# Patient Record
Sex: MALE | Race: OTHER
[De-identification: names, ages, dates, MRNs, and addresses within clinical notes are randomized per-mention and may not be internally consistent; named-entity substitution may affect disease eponyms.]

---

## 2017-02-25 ENCOUNTER — HOSPITAL ENCOUNTER (EMERGENCY)
Dept: HOSPITAL 54 - ER | Age: 47
Discharge: HOME | End: 2017-02-25
Payer: MEDICAID

## 2017-02-25 VITALS — WEIGHT: 250 LBS | BODY MASS INDEX: 35.79 KG/M2 | HEIGHT: 70 IN

## 2017-02-25 VITALS — DIASTOLIC BLOOD PRESSURE: 84 MMHG | SYSTOLIC BLOOD PRESSURE: 138 MMHG

## 2017-02-25 DIAGNOSIS — S93.402A: Primary | ICD-10-CM

## 2017-02-25 DIAGNOSIS — I10: ICD-10-CM

## 2017-02-25 DIAGNOSIS — F17.200: ICD-10-CM
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
## 2017-02-25 PROCEDURE — Z7610: HCPCS

## 2017-02-25 PROCEDURE — 73610 X-RAY EXAM OF ANKLE: CPT

## 2017-02-25 PROCEDURE — 99284 EMERGENCY DEPT VISIT MOD MDM: CPT

## 2017-02-25 PROCEDURE — 29515 APPLICATION SHORT LEG SPLINT: CPT

## 2017-02-25 PROCEDURE — A4606 OXYGEN PROBE USED W OXIMETER: HCPCS

## 2017-02-25 PROCEDURE — 73630 X-RAY EXAM OF FOOT: CPT

## 2019-06-03 ENCOUNTER — HOSPITAL ENCOUNTER (OUTPATIENT)
Dept: HOSPITAL 10 - E/R | Age: 49
Setting detail: OBSERVATION
Discharge: HOME | End: 2019-06-03
Attending: INTERNAL MEDICINE | Admitting: INTERNAL MEDICINE
Payer: COMMERCIAL

## 2019-06-03 ENCOUNTER — HOSPITAL ENCOUNTER (OUTPATIENT)
Dept: HOSPITAL 91 - E/R | Age: 49
Setting detail: OBSERVATION
Discharge: HOME | End: 2019-06-03
Payer: COMMERCIAL

## 2019-06-03 VITALS — DIASTOLIC BLOOD PRESSURE: 76 MMHG | RESPIRATION RATE: 19 BRPM | HEART RATE: 78 BPM | SYSTOLIC BLOOD PRESSURE: 130 MMHG

## 2019-06-03 VITALS — HEART RATE: 69 BPM

## 2019-06-03 VITALS — WEIGHT: 245.37 LBS | HEIGHT: 60 IN | BODY MASS INDEX: 48.17 KG/M2

## 2019-06-03 DIAGNOSIS — R07.89: Primary | ICD-10-CM

## 2019-06-03 DIAGNOSIS — E11.9: ICD-10-CM

## 2019-06-03 DIAGNOSIS — F17.200: ICD-10-CM

## 2019-06-03 DIAGNOSIS — E78.5: ICD-10-CM

## 2019-06-03 DIAGNOSIS — I10: ICD-10-CM

## 2019-06-03 DIAGNOSIS — Z79.84: ICD-10-CM

## 2019-06-03 LAB
ADD MAN DIFF?: NO
ANION GAP: 11 (ref 5–13)
BASOPHIL #: 0.1 10^3/UL (ref 0–0.1)
BASOPHILS %: 1.4 % (ref 0–2)
BLOOD UREA NITROGEN: 9 MG/DL (ref 7–20)
CALCIUM: 9.6 MG/DL (ref 8.4–10.2)
CARBON DIOXIDE: 25 MMOL/L (ref 21–31)
CHLORIDE: 105 MMOL/L (ref 97–110)
CK INDEX: 0.6
CK-MB: 0.32 NG/ML (ref 0–2.4)
CREATINE KINASE: 51 IU/L (ref 23–200)
CREATININE: 0.6 MG/DL (ref 0.61–1.24)
D-DIMER: 353.84 NG/ML (ref ?–460)
EOSINOPHILS #: 0.1 10^3/UL (ref 0–0.5)
EOSINOPHILS %: 2.1 % (ref 0–7)
GLUCOSE: 172 MG/DL (ref 70–220)
HEMATOCRIT: 44.2 % (ref 42–52)
HEMOGLOBIN: 14.9 G/DL (ref 14–18)
IMMATURE GRANS #M: 0.03 10^3/UL (ref 0–0.03)
IMMATURE GRANS % (M): 0.5 % (ref 0–0.43)
LYMPHOCYTES #: 2 10^3/UL (ref 0.8–2.9)
LYMPHOCYTES %: 35.2 % (ref 15–51)
MEAN CORPUSCULAR HEMOGLOBIN: 28.2 PG (ref 29–33)
MEAN CORPUSCULAR HGB CONC: 33.7 G/DL (ref 32–37)
MEAN CORPUSCULAR VOLUME: 83.6 FL (ref 82–101)
MEAN PLATELET VOLUME: 10.2 FL (ref 7.4–10.4)
MONOCYTE #: 0.4 10^3/UL (ref 0.3–0.9)
MONOCYTES %: 6.2 % (ref 0–11)
NEUTROPHIL #: 3.2 10^3/UL (ref 1.6–7.5)
NEUTROPHILS %: 54.6 % (ref 39–77)
NUCLEATED RED BLOOD CELLS #: 0 10^3/UL (ref 0–0)
NUCLEATED RED BLOOD CELLS%: 0 /100WBC (ref 0–0)
PLATELET COUNT: 157 10^3/UL (ref 140–415)
POTASSIUM: 4 MMOL/L (ref 3.5–5.1)
RED BLOOD COUNT: 5.29 10^6/UL (ref 4.7–6.1)
RED CELL DISTRIBUTION WIDTH: 13.2 % (ref 11.5–14.5)
SODIUM: 141 MMOL/L (ref 135–144)
TROPONIN-I: < 0.012 NG/ML (ref 0–0.12)
TROPONIN-I: < 0.012 NG/ML (ref 0–0.12)
WHITE BLOOD COUNT: 5.8 10^3/UL (ref 4.8–10.8)

## 2019-06-03 PROCEDURE — 82550 ASSAY OF CK (CPK): CPT

## 2019-06-03 PROCEDURE — 82553 CREATINE MB FRACTION: CPT

## 2019-06-03 PROCEDURE — 75574 CT ANGIO HRT W/3D IMAGE: CPT

## 2019-06-03 PROCEDURE — 93005 ELECTROCARDIOGRAM TRACING: CPT

## 2019-06-03 PROCEDURE — 99285 EMERGENCY DEPT VISIT HI MDM: CPT

## 2019-06-03 PROCEDURE — 85025 COMPLETE CBC W/AUTO DIFF WBC: CPT

## 2019-06-03 PROCEDURE — 85378 FIBRIN DEGRADE SEMIQUANT: CPT

## 2019-06-03 PROCEDURE — 80048 BASIC METABOLIC PNL TOTAL CA: CPT

## 2019-06-03 PROCEDURE — 71045 X-RAY EXAM CHEST 1 VIEW: CPT

## 2019-06-03 PROCEDURE — 36415 COLL VENOUS BLD VENIPUNCTURE: CPT

## 2019-06-03 PROCEDURE — 93306 TTE W/DOPPLER COMPLETE: CPT

## 2019-06-03 PROCEDURE — 84484 ASSAY OF TROPONIN QUANT: CPT

## 2019-06-03 RX ADMIN — METOPROLOL TARTRATE 1 MG: 100 TABLET ORAL at 16:14

## 2019-06-03 RX ADMIN — ATORVASTATIN CALCIUM 1 MG: 10 TABLET, FILM COATED ORAL at 21:00

## 2019-06-03 RX ADMIN — NITROGLYCERIN 1 INCH: 20 OINTMENT TOPICAL at 13:12

## 2019-06-03 NOTE — CONS
Assessment/Plan


Assessment/Plan


Hospital Course (Demo Recall)


Chest pain


Shortness of breath


Diabetes


Hypertension


Dyslipidemia


Tobacco use





Patient with chest pain at rest which is been off and on over the past few days.


 Initial cardiac enzymes are negative, ECG with no significant ischemic 


abnormalities.


Given his multiple risk factors, patient with further ischemic work-up.  Will 


obtain serial cardiac enzymes, echocardiogram


Initial plans for CT coronary angiogram, in discussion with radiology, there is 


seems to be issues with the imaging software.  If not able to be performed, will


proceed with nuclear cardiac perfusion study once serial troponins are negative





Consultation Date/Type/Reason


Admit Date/Time





Type of Consult


Cardiology


Reason for Consultation


Chest pain


Date/Time of Note


DATE: 6/3/19 


TIME: 15:18





Hx of Present Illness


This is a 48-year-old male with past medical history of hypertension, diabetes, 


dyslipidemia who presents with chest pain.  Symptoms have been off and on since 


yesterday.  Symptoms came about while patient was driving.  He denies exertional


chest pain but does get shortness of breath with exertion which has been going 


on for years.  He also complains of a chronic numbness in his fourth and fifth 


finger which is going on for 6 months.  This is not worse with exertion or 


improved.


12 point review of systems was performed with all pertinent positives and 


negatives mentioned above and all else is negative





Past Medical History


Medical History:  diabetes, high cholesterol, hypertension


Home Meds


Reported Medications


Amlodipine Besylate* (Norvasc*) 10 Mg Tablet, 10 MG PO DAILY, TAB


   6/3/19


Metformin Hcl* (Metformin Hcl*) 500 Mg Tablet, 500 MG PO WITH BREAKFAST, #30 TAB


   6/3/19


Atorvastatin Calcium (Atorvastatin Calcium) 10 Mg Tablet, 10 MG PO QHS, #30 TAB


   6/3/19


Medications





Current Medications


Nitroglycerin (Nitroglycerin (Sl Tab) 0.4 Mg) 1 tab Q5M UP TO 3 DOSES PRN SL 


.CHEST PAIN;  Start 6/3/19 at 13:00


Ondansetron HCl (Zofran Inj) 4 mg ER BRIDGE PRN IV NAUSEA/VOMITING;  Start 


6/3/19 at 14:00;  Stop 6/4/19 at 13:59


Acetaminophen (Tylenol Tab) 650 mg ER BRIDGE PRN PO .MILD PAIN 1-3 OR TEMP;  


Start 6/3/19 at 14:00;  Stop 6/4/19 at 13:59


Allergies:  


Coded Allergies:  


     aspirin (Verified  Allergy, Unknown, 6/3/19)





Social History


Alcohol Use:  occasionally


Smoking Status:  Current every day smoker


Other Social History








Exam/Review of Systems


Vital Signs


Vitals





Vital Signs


  Date      Temp  Pulse  Resp  B/P (MAP)   Pulse Ox  O2          O2 Flow    FiO2


Time                                                 Delivery    Rate


    6/3/19           66    20      132/71        99  Room Air


     14:08                           (91)


    6/3/19  97.7


     11:57








Exam


Constitutional:  alert, oriented (No apparent distress)


Head:  normocephalic


Respiratory:  clear to auscultation, normal air movement


Cardiovascular:  regular rate and rhythm (S1-S2 heard)


Gastrointestinal:  soft, non-tender, bowel sounds


Extremities:  other (No significant edema)





Labs


Result Diagram:  


6/3/19 1252                                                                     


          6/3/19 1252





Results 24hrs





Laboratory Tests


               Test
                                6/3/19
12:52


               White Blood Count                           5.8


               Red Blood Count                            5.29


               Hemoglobin                                 14.9


               Hematocrit                                 44.2


               Mean Corpuscular Volume                    83.6


               Mean Corpuscular Hemoglobin               28.2  L


               Mean Corpuscular Hemoglobin
Concent       33.7  



               Red Cell Distribution Width                13.2


               Platelet Count                              157


               Mean Platelet Volume                       10.2


               Immature Granulocytes %                  0.500  H


               Neutrophils %                              54.6


               Lymphocytes %                              35.2


               Monocytes %                                 6.2


               Eosinophils %                               2.1


               Basophils %                                 1.4


               Nucleated Red Blood Cells %                 0.0


               Immature Granulocytes #                   0.030


               Neutrophils #                               3.2


               Lymphocytes #                               2.0


               Monocytes #                                 0.4


               Eosinophils #                               0.1


               Basophils #                                 0.1


               Nucleated Red Blood Cells #                 0.0


               D-Dimer                                  353.84


               D-Dimer Comment


               Sodium Level                                141


               Potassium Level                             4.0


               Chloride Level                              105


               Carbon Dioxide Level                         25


               Anion Gap                                    11


               Blood Urea Nitrogen                           9


               Creatinine                                0.60  L


               Est Glomerular Filtrat Rate
mL/min   > 60  



               Glucose Level                               172


               Calcium Level                               9.6


               Troponin I                           < 0.012








Imaging


Imaging


ECG with sinus rhythm at 88 bpm, incomplete right bundle branch block with QRS 


106 ms, no significant ischemic ST abnormalities





Medications


Medications





Current Medications


Nitroglycerin (Nitroglycerin (Sl Tab) 0.4 Mg) 1 tab Q5M UP TO 3 DOSES PRN SL 


.CHEST PAIN;  Start 6/3/19 at 13:00


Ondansetron HCl (Zofran Inj) 4 mg ER BRIDGE PRN IV NAUSEA/VOMITING;  Start 


6/3/19 at 14:00;  Stop 6/4/19 at 13:59


Acetaminophen (Tylenol Tab) 650 mg ER BRIDGE PRN PO .MILD PAIN 1-3 OR TEMP;  


Start 6/3/19 at 14:00;  Stop 6/4/19 at 13:59











Ravi Marie DO            Kamran 3, 2019 15:24

## 2019-06-03 NOTE — PDOCDIS
Discharge Instructions


CONDITION


                 [id number]
Patient Condition:  [id number]
Good








HOME CARE INSTRUCTIONS:


          [id number]
Diet Instructions:            [id number]

          [id number]
Activity Restrictions:  [id number]
No Restrictions








FOLLOW UP/APPOINTMENTS


Follow-up Plan


pcp 1 week











SHANICE JONES MD                   Kamran 3, 2019 21:25

## 2019-06-03 NOTE — ERD
ER Documentation


Chief Complaint


Chief Complaint





CHEST PAIN FOR THE PAST WEEK INTERMITTENT. TODAY GOT WORSE. MILD SOB





HPI


Patient is a 48-year-old male with hypertension and prediabetes who presents 


with chest pain.  He said that he started feeling weak about 1 hour ago.  He had


shortness of breath and chest pressure.  He feels dizzy.  He had nausea but no 


vomiting.  The chest pressure was left-sided.  It comes and goes.  He felt this 


on Thursday as well and almost passed out.  The patient said that he would 


prefer to be seen by Dr. Perez's cardiology group.  Upon review of old 


medical records this is the patient's first visit to the emergency department.





ROS


All systems reviewed and are negative except as per history of present illness.





Medications


Home Meds


Reported Medications


Amlodipine Besylate* (Norvasc*) 10 Mg Tablet, 10 MG PO DAILY, TAB


   6/3/19


Metformin Hcl* (Metformin Hcl*) 500 Mg Tablet, 500 MG PO WITH BREAKFAST, #30 TAB


   6/3/19


Atorvastatin Calcium (Atorvastatin Calcium) 10 Mg Tablet, 10 MG PO QHS, #30 TAB


   6/3/19





Allergies


Allergies:  


Coded Allergies:  


     aspirin (Verified  Allergy, Unknown, 6/3/19)





PMhx/Soc


Medical and Surgical Hx:  pt denies Medical Hx, pt denies Surgical Hx


Hx Alcohol Use:  Yes


Hx Substance Use:  No


Hx Tobacco Use:  Yes


Smoking Status:  Never smoker





FmHx


Family History:  coronary disease





Physical Exam


Vitals





Vital Signs


  Date      Temp  Pulse  Resp  B/P (MAP)   Pulse Ox  O2          O2 Flow    FiO2


Time                                                 Delivery    Rate


    6/3/19           66    20      132/71        99  Room Air


     14:08                           (91)


    6/3/19  97.7     91    18      163/95        99


     11:57                          (117)





Physical Exam


Const:   No acute distress


Head:   Atraumatic 


Eyes:    Normal Conjunctiva


ENT:    Normal External Ears, Nose and Mouth.


Neck:               Full range of motion. No meningismus.


Resp:   Clear to auscultation bilaterally


Cardio:   Regular rate and rhythm, no murmurs


Abd:    Soft, non tender, non distended. Normal bowel sounds


Skin:   No petechiae or rashes


Back:   No midline or flank tenderness


Ext:    No cyanosis, or edema


Neur:   Awake and alert


Psych:    Normal Mood and Affect


Result Diagram:  


6/3/19 1252                                                                     


          6/3/19 1252





Results 24 hrs





Laboratory Tests


              Test
                                  6/3/19
12:52


              White Blood Count                      5.8 10^3/ul


              Red Blood Count                       5.29 10^6/ul


              Hemoglobin                               14.9 g/dl


              Hematocrit                                  44.2 %


              Mean Corpuscular Volume                    83.6 fl


              Mean Corpuscular Hemoglobin                28.2 pg


              Mean Corpuscular Hemoglobin
Concent     33.7 g/dl 



              Red Cell Distribution Width                 13.2 %


              Platelet Count                         157 10^3/UL


              Mean Platelet Volume                       10.2 fl


              Immature Granulocytes %                    0.500 %


              Neutrophils %                               54.6 %


              Lymphocytes %                               35.2 %


              Monocytes %                                  6.2 %


              Eosinophils %                                2.1 %


              Basophils %                                  1.4 %


              Nucleated Red Blood Cells %            0.0 /100WBC


              Immature Granulocytes #              0.030 10^3/ul


              Neutrophils #                          3.2 10^3/ul


              Lymphocytes #                          2.0 10^3/ul


              Monocytes #                            0.4 10^3/ul


              Eosinophils #                          0.1 10^3/ul


              Basophils #                            0.1 10^3/ul


              Nucleated Red Blood Cells #            0.0 10^3/ul


              D-Dimer                               353.84 ng/ml


              D-Dimer Comment


              Sodium Level                            141 mmol/L


              Potassium Level                         4.0 mmol/L


              Chloride Level                          105 mmol/L


              Carbon Dioxide Level                     25 mmol/L


              Anion Gap                                       11


              Blood Urea Nitrogen                        9 mg/dl


              Creatinine                              0.60 mg/dl


              Est Glomerular Filtrat Rate
mL/min   > 60 mL/min 



              Glucose Level                            172 mg/dl


              Calcium Level                            9.6 mg/dl


              Troponin I                           < 0.012 ng/ml





Current Medications


 Medications
   Dose
          Sig/Damien
       Start Time
   Status  Last


 (Trade)       Ordered        Route
 PRN     Stop Time              Admin
Dose


                              Reason                                Admin


                1 inch         ONCE  STAT
    6/3/19        DC            6/3/19


Nitroglycerin                 TD
            12:45
 6/3/19                13:12




                                            12:47


(Nitroglyceri


n
 2% Oint)


                1 tab          Q5M UP TO 3    6/3/19                 



Nitroglycerin                 DOSES PRN
     13:00




                             SL
 .CHEST


(Nitroglyceri                 PAIN


n
  (Sl Tab)


0.4 Mg)


 Ondansetron    4 mg           ER BRIDGE      6/3/19                 



HCl
  (Zofran                 PRN
 IV
       14:00
 6/4/19


Inj)                          NAUSEA/VOMITI  13:59


                              NG


                650 mg         ER BRIDGE      6/3/19                 



Acetaminophen                 PRN
 PO
       14:00
 6/4/19



  (Tylenol                   .MILD PAIN     13:59


Tab)                          1-3 OR TEMP


 Metoprolol
    100 mg         ONCE  STAT
    6/3/19        DC       



Tartrate
                     PO
            14:01
 6/3/19


(Lopressor)                                  14:08








Procedures/MDM


EKG read by me: 


Rate/Rhythm: Regular rate and rhythm at a normal rate


Intervals:    Normal


Impression:     No evidence of ischemia or arrhythmia





Chest x-ray negative per radiology.





Smoking Cessation Therapy:  Pt. was lectured for greater than  3 minutes on the 


health risks of continued smoking and the benefits of cessation.





Patient is a 48-year-old male with multiple cardiac risk factors who presents 


with chest pain.  I am concerned for potential acute coronary syndrome.  D-dimer


was negative and I doubt pulmonary embolism or aortic dissection at this time.  


I doubt pneumonia or pneumothorax.  The patient was given nitroglycerin.  He is 


allergic to aspirin and says that his face swells up when he takes it so he was 


not given aspirin.  The patient will be admitted to the care of Dr. Prakash to a 


telemetry observation bed as he has regal insurance.  He was seen by Dr. Marie 


in consultation.





Departure


Diagnosis:  


   Primary Impression:  


   Chest pain


   Chest pain type:  unspecified  Qualified Codes:  R07.9 - Chest pain, 


   unspecified


Condition:  ALEXANDRIA Sullivan MD                 Kamran 3, 2019 14:40

## 2019-06-03 NOTE — RADRPT
Echocardiogram Report

 

Patient Name: KESHA FERNANDEZPatient ID: [id number]

: 1970 (49y )Study Date: 2019 2:42:17 PM

Gender: MAccession #: [id number]

Tech: MARÍTN Cowan RDCS                        Location: [facility]         

Ref.Physician: RAVI MARIE            Height(Cm):            

 

BSA: Weight(Kg):

Quality: Technically Difficult StudyOrder Physician: RAVI MARIE

Account #:

 

 

Procedures:

 

Echocardiographic Report:

Transthoracic echocardiogram with complete 2D, M-Mode, and doppler 

examination.

 

Indications:

 

Chest Pain.

 

 

Measurements:

2D/M Mode                                   Doppler                                               

Measurement    Value    Normal Range        Measurement      Value    Normal Range                

LVIDd 2D       5.6      [ 4.2 - 5.8 ] cm    AV Peak Jabier      1.1      [ 100.0 - 170.0 ] cm/sec    

LVIDs 2D       3.7      [ 2.5 - 4.0 ] cm    AV Peak PG       5.0      [ 2.0 - 9.0 ] mmHg          

LVPWd 2D       1.0      [ 0.6 - 1.0 ] cm    LVOT Peak Jabier    1.0      [ 70.0 - 110.0 ] cm/sec     

IVSd 2D        1.0      [ 0.6 - 1.0 ] cm    LVOT Peak PG     4.0      [ 2.0 - 6.0 ] mmHg          

IVS/LVPW 2D    1.0      ratio               MV E Peak Jabier    0.8      [ 60.0 - 130.0 ] cm/sec     

AoR Diam 2D    3.1      [ 2.6 - 3.4 ] cm    MV A Peak Jabier    0.7      [ 100.0 - 120.0 ] cm/sec    

LA/Ao 2D       1        ratio               MV E/A           1.1      [ 0.8 - 1.5 ] ratio         

LA Dimen 2D    3.4      [ 3.0 - 4.0 ] cm    MV Decel Time    225      [ 104 - 258 ] msec          

                                            Lat E` Jabier       0.1      [ 10.0 - 15.0 ] cm/sec      

                                            MV E/A           1.1      [ 0.8 - 1.5 ] ratio         

                                            TR Peak Jabier      2.0      [ 100.0 - 280.0 ] cm/sec    

                                            TR Peak PG       16.0     mmHg                        

                                            RVSP             19.0     [ 10.0 - 36.0 ] mmHg        

                                            RA Pressure      3.0      mmHg                        

 

Findings:

 

Left Ventricle:

Normal left ventricular systolic function. Normal left ventricular 

cavity size. Normal left ventricular wall thickness. Ejection fraction 

is visually estimated at 65 %. Tissue Doppler/Mitral Doppler indices are 

within normal limits.

 

Right Ventricle:

Normal right ventricular size. Normal right ventricular systolic 

function.

 

Left Atrium:

The left atrium is normal in size.

 

Right Atrium:

The right atrium is normal in size.

 

Mitral Valve:

Normal appearance and function of the mitral valve with trace 

physiologic regurgitation.

 

Aortic Valve:

Normal appearance of the aortic valve. No significant aortic stenosis or 

insufficiency.

 

Tricuspid Valve:

Normal appearance and function of the tricuspid valve with trace 

physiologic regurgitation. Normal right ventricular systolic pressure. 

The estimated Peak RVSP is 16 mmHg.

 

Pulmonic Valve:

Normal pulmonic valve appearance.

 

Pericardium:

Normal pericardium with no significant pericardial effusion.

 

Aorta:

Normal aortic root.

 

IVC:

Normal size and normal respiratory collapse consistent with normal right 

atrial pressure.

 

 

Conclusions:

 

Normal left ventricular systolic function. Normal left ventricular 

cavity size. Normal left ventricular wall thickness. Ejection fraction 

is visually estimated at 65 %. Tissue Doppler/Mitral Doppler indices are 

within normal limits.

 

Normal right ventricular size. Normal right ventricular systolic 

function.

 

The left atrium is normal in size.

 

The right atrium is normal in size.

 

No significant valvular stenosis or regurgitation seen.

 

Normal pericardium with no significant pericardial effusion.

 

Electronically Signed By:

 

Ravi Marie

2019 16:20:12 PDT

## 2019-06-03 NOTE — HP
DATE OF ADMISSION: 06/03/2019

 

CHIEF COMPLAINT:  Chest pain.

 

HISTORY OF PRESENT ILLNESS:  A 48-year-old male with history of hypertension, type 2 diabetes mellitu
s and hyperlipidemia presented to emergency room with complaint of chest pain.  The symptoms started 
the day prior to admission.  The patient denies exertional chest pain; however, he does get short of 
breath at times with exertion.  No nausea, vomiting or diaphoresis.

 

Initial evaluation was unremarkable.  Cardiac enzyme was normal.  EKG did not show any significant is
chemic changes.  A 2D echo showed normal ejection fraction.  The patient was evaluated by Dr. Marie 
and CT coronary angiogram was recommended.

 

PAST MEDICAL HISTORY:

1.  Hypertension.

2.  Hyperlipidemia.

3.  Type 2 diabetes mellitus.

 

MEDICATIONS PRIOR TO ADMISSION:

1.  Amlodipine 10 mg daily.

2.  Atorvastatin 10 mg at bedtime.

3.  Metformin 500 mg daily.

 

SOCIAL HISTORY:  The patient lives at home.  He admits to smoking cigarettes.

 

PHYSICAL EXAMINATION:

GENERAL:  Well-developed, well-nourished male who is in no apparent distress.

VITAL SIGNS:  Stable.  He is afebrile.

HEENT:  Extraocular muscles intact.  Pupils equal and reactive to light bilaterally.  Sclerae anicter
ic.  Oropharynx is clear and moist.

NECK:  Supple.  No JVD.  No carotid bruits.

LUNGS:  Clear to auscultation bilaterally.

CARDIAC:  Regular rate and rhythm.  No murmurs or gallops.

ABDOMEN:  Soft, nontender, nondistended.  Normoactive bowel sounds.

EXTREMITIES:  No clubbing, cyanosis or edema.

NEUROLOGICAL:  Nonfocal.

 

LABORATORY DATA:  CBC is within normal limit.  Basic metabolic panel is normal.  Troponin is less gaye
n 0.012.

 

ASSESSMENT:  A 48-year-old male with on-and-off chest pain since the day prior to admission.  The pat
ient has multiple cardiac risk factors but there is no clear evidence of ischemia.

 

PLAN:  Place in tele observation.  Resume selected home medications.  Proceed with CT coronary angiog
[place].  Cardiology followup is appreciated.

 

 

Dictated By: SHANICE GARZON/ELISA

DD:    06/03/2019 17:00:11

DT:    06/03/2019 18:20:11

Conf#: [id number]

DID#:  [id number]

## 2019-06-05 NOTE — DS
DATE OF ADMISSION: 06/03/2019

DATE OF DISCHARGE: 06/03/2019

 

DIAGNOSES:

1.  A 48-year-old male with atypical chest pain.

2.  Hypertension.

3.  Hyperlipidemia.

4.  Type 2 diabetes mellitus. 

 

PROCEDURES DURING HOSPITALIZATION:  Cardiac CT angiogram and 2D echo.

 

HOSPITAL COURSE:  A 48-year-old male with multiple cardiac risk factors, presented to emergency room 
with complaint of chest pain, which started the day prior to admission.  The patient had no additiona
l symptoms.  There were no associated symptoms except mild shortness of breath at times.  He was eval
uated by Dr. Marie.  CTA of the coronary arteries was recommended.  The study showed a total calcium
 score of 0.  All coronary vessels were found to be normal.  The patient also had a 2D echocardiogram
, which showed a normal ejection fraction of 65% and no other abnormalities.

 

DISPOSITION:  Discharged home in a stable condition.  He was asked to follow up with the PCP and cont
inue his home medications.

 

 

Dictated By: SHANICE GARZON/ELISA

DD:    06/04/2019 11:45:34

DT:    06/05/2019 02:19:28

Conf#: [id number]

DID#:  [id number]

CC: RAISSA MARIE DO;*EndCC*

## 2024-02-05 ENCOUNTER — HOSPITAL ENCOUNTER (EMERGENCY)
Dept: HOSPITAL 54 - ER | Age: 54
Discharge: HOME | End: 2024-02-05
Payer: COMMERCIAL

## 2024-02-05 VITALS — DIASTOLIC BLOOD PRESSURE: 117 MMHG | SYSTOLIC BLOOD PRESSURE: 191 MMHG | OXYGEN SATURATION: 98 % | TEMPERATURE: 98.5 F

## 2024-02-05 VITALS — WEIGHT: 225 LBS | BODY MASS INDEX: 32.21 KG/M2 | HEIGHT: 70 IN

## 2024-02-05 DIAGNOSIS — F17.200: ICD-10-CM

## 2024-02-05 DIAGNOSIS — T16.2XXA: Primary | ICD-10-CM
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
## 2024-02-05 DIAGNOSIS — Z88.8: ICD-10-CM
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
## 2024-02-05 DIAGNOSIS — I10: ICD-10-CM
